# Patient Record
Sex: FEMALE | Race: ASIAN | URBAN - METROPOLITAN AREA
[De-identification: names, ages, dates, MRNs, and addresses within clinical notes are randomized per-mention and may not be internally consistent; named-entity substitution may affect disease eponyms.]

---

## 2017-03-20 ENCOUNTER — OFFICE VISIT (OUTPATIENT)
Dept: FAMILY MEDICINE CLINIC | Age: 82
End: 2017-03-20

## 2017-03-20 VITALS
HEART RATE: 72 BPM | WEIGHT: 95.8 LBS | TEMPERATURE: 97.4 F | BODY MASS INDEX: 21.55 KG/M2 | SYSTOLIC BLOOD PRESSURE: 138 MMHG | DIASTOLIC BLOOD PRESSURE: 78 MMHG | HEIGHT: 56 IN

## 2017-03-20 DIAGNOSIS — I10 ESSENTIAL HYPERTENSION WITH GOAL BLOOD PRESSURE LESS THAN 140/90: Primary | ICD-10-CM

## 2017-03-20 RX ORDER — ATENOLOL 50 MG/1
50 TABLET ORAL DAILY
Qty: 90 TAB | Refills: 1 | Status: SHIPPED | OUTPATIENT
Start: 2017-03-20 | End: 2017-09-18 | Stop reason: SDUPTHER

## 2017-03-20 NOTE — PATIENT INSTRUCTIONS

## 2017-03-20 NOTE — PROGRESS NOTES
Assessment/Plan:       ICD-10-CM ICD-9-CM    1. Essential hypertension with goal blood pressure less than 140/90 I10 401.9 atenolol (TENORMIN) 50 mg tablet     Follow-up Disposition:  Return in about 5 months (around 8/20/2017) for or sooner as needed. [facility] [place]  Subjective:   Orquidea Diaz is a 80 y.o.  female who speaks [place]. She has 9 children. Youngest daughter is age 39 and is here with her today. She did take this medication this morning. Chief Complaint   Patient presents with    Medication Refill    Follow-up    History of Present Illness: She ate bread and cheese around 11 am.  Used cream cheese on [place] bread. And water. and sweet rice made with sesame seeds, salt and sugar (sticky rice). I checked the nutritional value of sodium for [place] bread and it is quite high. For one \"medium\" slice weighing 59 gm, there is 425 mg of sodium. Also has 8g protein, 1.8gm fiber, 37g carb. Cream cheese is one of the lower sodium spreads at 85mg per oz. Review of Systems: Negative for: fever, chest pain, shortness of breath, leg swelling, exertional dyspnea, palpitations. Past Surgical History: She  has no past surgical history on file. Social History: She  reports that she has never smoked. She does not have any smokeless tobacco history on file. She reports that she does not drink alcohol or use illicit drugs. Objective:     Vitals:    03/20/17 1209 03/20/17 1417   BP: 181/89 138/78   Pulse: 72    Temp: 97.4 °F (36.3 °C)    TempSrc: Oral    Weight: 95 lb 12.8 oz (43.5 kg)    Height: 4' 7.51\" (1.41 m)     No LMP recorded. Patient is postmenopausal.   Wt Readings from Last 2 Encounters:   03/20/17 95 lb 12.8 oz (43.5 kg)   07/18/16 91 lb (41.3 kg)     Lab Review:No results found for any visits on 03/20/17. Physical Examination:BP high today but improved on recheck. General appearance - well developed, no acute distress. Chest - clear to auscultation.   Heart - regular rate and rhythm without murmurs, rubs, or gallops. Abdomen - bowel sounds present x 4, NT, ND. Extremities - pulses intact. No peripheral edema. Assessment/Plan:   Tuyet Villalta was seen today for medication refill and follow-up. Diagnoses and all orders for this visit:    Essential hypertension with goal blood pressure less than 140/90  -     atenolol (TENORMIN) 50 mg tablet; Take 1 Tab by mouth daily. For blood pressure. Traditional foods/flavorings are more identifiable as sources of sodium. [place] bread is surprisingly high in sodium also. Limit this or seek out lower sodium breads. Follow-up Disposition:  Return in about 5 months (around 8/20/2017) for or sooner as needed. Jmamie Gross, MSN, RN, FNP-BC, BC-ADM  Gretchen Kasper expressed understanding of this plan.

## 2017-03-20 NOTE — PROGRESS NOTES
Coordination of Care  1. Have you been to the ER, urgent care clinic since your last visit? Hospitalized since your last visit? No    2. Have you seen or consulted any other health care providers outside of the [address] [place] [address] since your last visit? Include any pap smears or colon screening. No    Medications  Medication Reconciliation Performed: no  Patient does need refills     Learning Assessment Complete?  yes

## 2017-09-18 ENCOUNTER — OFFICE VISIT (OUTPATIENT)
Dept: FAMILY MEDICINE CLINIC | Age: 82
End: 2017-09-18

## 2017-09-18 ENCOUNTER — HOSPITAL ENCOUNTER (OUTPATIENT)
Dept: LAB | Age: 82
Discharge: HOME | End: 2017-09-18

## 2017-09-18 VITALS
TEMPERATURE: 97.7 F | BODY MASS INDEX: 21.4 KG/M2 | HEART RATE: 55 BPM | WEIGHT: 93.8 LBS | SYSTOLIC BLOOD PRESSURE: 160 MMHG | DIASTOLIC BLOOD PRESSURE: 86 MMHG

## 2017-09-18 DIAGNOSIS — I10 ESSENTIAL HYPERTENSION WITH GOAL BLOOD PRESSURE LESS THAN 140/90: Primary | ICD-10-CM

## 2017-09-18 DIAGNOSIS — I10 ESSENTIAL HYPERTENSION WITH GOAL BLOOD PRESSURE LESS THAN 140/90: ICD-10-CM

## 2017-09-18 PROCEDURE — 80053 COMPREHEN METABOLIC PANEL: CPT | Performed by: NURSE PRACTITIONER

## 2017-09-18 RX ORDER — ATENOLOL 50 MG/1
50 TABLET ORAL DAILY
Qty: 90 TAB | Refills: 1 | Status: SHIPPED | OUTPATIENT
Start: 2017-09-18 | End: 2017-12-18 | Stop reason: SDUPTHER

## 2017-09-18 NOTE — PROGRESS NOTES
Assessment/Plan:       ICD-10-CM ICD-9-CM    1. Essential hypertension with goal blood pressure less than 140/90 I10 401.9 atenolol (TENORMIN) 50 mg tablet      METABOLIC PANEL, COMPREHENSIVE     Follow-up Disposition:  Return in about 4 weeks (around 10/16/2017) for hypertension. Subjective:   Olive Shen is a [de-identified] y.o. female who presents for follow up of hypertension. She usually takes her blood pressure medication after breakfast, 7:30 or 8 am.    Chief Complaint   Patient presents with    Hypertension     Hypertension recheck    She also  has no past medical history on file. Radha Walters HPI:   Last took medication:   Measuring blood pressures outside of clinic: no   Working? No Physical activity: no  Healthy diet? No   Family History: family history is not on file. Social History:  reports that she has never smoked. She does not have any smokeless tobacco history on file. She reports that she does not drink alcohol or use illicit drugs. Cardiovascular ROS: no chest pain on exertion, no dyspnea on exertion, no swelling of ankles. Objective:     Vitals:    09/18/17 1302 09/18/17 1304 09/18/17 1347   BP: 192/86 (!) 208/80 160/86   Pulse: 60 (!) 55    Temp: 97.7 °F (36.5 °C)     TempSrc: Oral     Weight: 93 lb 12.8 oz (42.5 kg)       Wt Readings from Last 2 Encounters:   09/18/17 93 lb 12.8 oz (42.5 kg)   03/20/17 95 lb 12.8 oz (43.5 kg)     No visits with results within 1 Month(s) from this visit.   Latest known visit with results is:    Hospital Outpatient Visit on 12/21/2015   Component Date Value Ref Range Status    Sodium 12/21/2015 140  136 - 145 mmol/L Final    Potassium 12/21/2015 4.2  3.5 - 5.1 mmol/L Final    Chloride 12/21/2015 103  97 - 108 mmol/L Final    CO2 12/21/2015 30  21 - 32 mmol/L Final    Anion gap 12/21/2015 7  5 - 15 mmol/L Final    Glucose 12/21/2015 79  65 - 100 mg/dL Final    BUN 12/21/2015 18  6 - 20 MG/DL Final    Creatinine 12/21/2015 0.69  0.55 - 1.02 MG/DL Final    BUN/Creatinine ratio 12/21/2015 26* 12 - 20   Final    GFR est AA 12/21/2015 >60  >60 ml/min/1.73m2 Final    GFR est non-AA 12/21/2015 >60  >60 ml/min/1.73m2 Final    Comment: Estimated GFR is calculated using the IDMS-traceable Modification of Diet in Renal Disease (MDRD) Study equation, reported for both  Americans (GFRAA) and non- Americans (GFRNA), and normalized to 1.73m2 body surface area. The physician must decide which value applies to the patient. The MDRD study equation should only be used in individuals age 25 or older. It has not been validated for the following: pregnant women, patients with serious comorbid conditions, or on certain medications, or persons with extremes of body size, muscle mass, or nutritional status.  Calcium 12/21/2015 9.2  8.5 - 10.1 MG/DL Final    Bilirubin, total 12/21/2015 0.4  0.2 - 1.0 MG/DL Final    ALT (SGPT) 12/21/2015 53  12 - 78 U/L Final    AST (SGOT) 12/21/2015 29  15 - 37 U/L Final    Alk. phosphatase 12/21/2015 84  45 - 117 U/L Final    Protein, total 12/21/2015 8.2  6.4 - 8.2 g/dL Final    Albumin 12/21/2015 4.4  3.5 - 5.0 g/dL Final    Globulin 12/21/2015 3.8  2.0 - 4.0 g/dL Final    A-G Ratio 12/21/2015 1.2  1.1 - 2.2   Final     Labs discussed with patient. Appearance: alert, well appearing, and in no distress and oriented to person, place, and time. General exam: CVS exam BP noted to be moderately elevated today in office, S1, S2 normal, no gallop, no murmur, chest clear, no JVD, no HSM, no edema. Assessment/Plan:   Hypertension in fair control. Diagnoses and all orders for this visit:    1. Essential hypertension with goal blood pressure less than 140/90  -     atenolol (TENORMIN) 50 mg tablet; Take 1 Tab by mouth daily. For blood pressure.  -     METABOLIC PANEL, COMPREHENSIVE; Future    Difficult to  blood pressure since she had not taken her medication this morning. Return 1 month for recheck.   If medication still in short supply, may need to change to metoprolol.

## 2017-09-19 LAB
ALBUMIN SERPL-MCNC: 4.1 G/DL (ref 3.5–5)
ALBUMIN/GLOB SERPL: 1 {RATIO} (ref 1.1–2.2)
ALP SERPL-CCNC: 93 U/L (ref 45–117)
ALT SERPL-CCNC: 49 U/L (ref 12–78)
ANION GAP SERPL CALC-SCNC: 7 MMOL/L (ref 5–15)
AST SERPL-CCNC: 32 U/L (ref 15–37)
BILIRUB SERPL-MCNC: 0.4 MG/DL (ref 0.2–1)
BUN SERPL-MCNC: 12 MG/DL (ref 6–20)
BUN/CREAT SERPL: 16 (ref 12–20)
CALCIUM SERPL-MCNC: 9.5 MG/DL (ref 8.5–10.1)
CHLORIDE SERPL-SCNC: 103 MMOL/L (ref 97–108)
CO2 SERPL-SCNC: 31 MMOL/L (ref 21–32)
CREAT SERPL-MCNC: 0.73 MG/DL (ref 0.55–1.02)
GLOBULIN SER CALC-MCNC: 4.2 G/DL (ref 2–4)
GLUCOSE SERPL-MCNC: 89 MG/DL (ref 65–100)
POTASSIUM SERPL-SCNC: 4.3 MMOL/L (ref 3.5–5.1)
PROT SERPL-MCNC: 8.3 G/DL (ref 6.4–8.2)
SODIUM SERPL-SCNC: 141 MMOL/L (ref 136–145)

## 2017-12-18 ENCOUNTER — OFFICE VISIT (OUTPATIENT)
Dept: FAMILY MEDICINE CLINIC | Age: 82
End: 2017-12-18

## 2017-12-18 VITALS
HEART RATE: 55 BPM | WEIGHT: 95 LBS | TEMPERATURE: 98.3 F | DIASTOLIC BLOOD PRESSURE: 90 MMHG | HEIGHT: 55 IN | SYSTOLIC BLOOD PRESSURE: 160 MMHG | BODY MASS INDEX: 21.98 KG/M2

## 2017-12-18 DIAGNOSIS — I10 ESSENTIAL HYPERTENSION WITH GOAL BLOOD PRESSURE LESS THAN 140/90: Primary | ICD-10-CM

## 2017-12-18 RX ORDER — ATENOLOL 50 MG/1
50 TABLET ORAL DAILY
Qty: 180 TAB | Refills: 0 | Status: SHIPPED | OUTPATIENT
Start: 2017-12-18 | End: 2018-05-21 | Stop reason: SDUPTHER

## 2017-12-18 NOTE — PROGRESS NOTES
Assessment/Plan:       ICD-10-CM ICD-9-CM    1. Essential hypertension with goal blood pressure less than 140/90 I10 401.9 atenolol (TENORMIN) 50 mg tablet   Please measure blood pressures outside of clinic and bring measurements with you next time. Carlos A [address] [place] [address]  Phone: [phone] Fax: [phone]      [facility]  Subjective:     Chief Complaint   Patient presents with    Hypertension     Hypertension recheck    Medication Refill    Leonila Alvarado is a [de-identified] y.o.  female. HPI: Last took her medication at 8 am this morning. Takes the medication regularly each day. Doesn't measure bp outside of clinic. She  has no past medical history on file. She has Essential hypertension with goal blood pressure less than 140/90 on her problem list.   Review of Systems: Negative for: fever, chest pain, shortness of breath, leg swelling, exertional dyspnea, palpitations. Current Medications:   No current outpatient prescriptions on file prior to visit. No current facility-administered medications on file prior to visit. Past Surgical History: She  has no past surgical history on file. Social and Family History: She  reports that she has never smoked. She does not have any smokeless tobacco history on file. She reports that she does not drink alcohol or use illicit drugs. ; family history is not on file. Objective:     Vitals:    12/18/17 1047 12/18/17 1050 12/18/17 1221   BP: 181/76 200/76 160/90   Pulse: 60 (!) 55    Temp: 98.3 °F (36.8 °C)     TempSrc: Oral     Weight: 95 lb (43.1 kg)     Height: 4' 6.72\" (1.39 m)      No LMP recorded. Patient is postmenopausal.   Wt Readings from Last 2 Encounters:   12/18/17 95 lb (43.1 kg)   09/18/17 93 lb 12.8 oz (42.5 kg)     No results found for any visits on 12/18/17. Physical Examination:  General appearance - well developed, no acute distress.    Chest - clear to auscultation. Heart - regular rate and rhythm without murmurs, rubs, or gallops. Abdomen - bowel sounds present x 4, NT, ND. Extremities - pulses intact. No peripheral edema. Assessment/Plan:   Diagnoses and all orders for this visit:    1. Essential hypertension with goal blood pressure less than 140/90  -     atenolol (TENORMIN) 50 mg tablet; Take 1 Tab by mouth daily. For blood pressure. Follow-up Disposition:  Return in about 5 months (around 5/18/2018) for hypertension. Meryle Racer, DNP, FNP-BC, BC-ADM  Cathy Ellison expressed understanding of this plan.

## 2017-12-18 NOTE — PROGRESS NOTES
Printed AVS, provided to pt and reviewed. Pt indicated understanding and had no questions. The pts medication were reviewed with her using Adela funez for . The pt was given a written rx. The medication was in stock at [facility] when nurse called. The medication was found to be less expensive at [facility]. The pt was told she could take it to either Pharmacy.  Emilee Romano RN

## 2018-05-21 ENCOUNTER — OFFICE VISIT (OUTPATIENT)
Dept: FAMILY MEDICINE CLINIC | Age: 83
End: 2018-05-21

## 2018-05-21 VITALS
HEART RATE: 61 BPM | TEMPERATURE: 97.7 F | DIASTOLIC BLOOD PRESSURE: 80 MMHG | BODY MASS INDEX: 22.07 KG/M2 | WEIGHT: 94 LBS | SYSTOLIC BLOOD PRESSURE: 200 MMHG

## 2018-05-21 DIAGNOSIS — I10 ESSENTIAL HYPERTENSION WITH GOAL BLOOD PRESSURE LESS THAN 140/90: Primary | ICD-10-CM

## 2018-05-21 RX ORDER — HYDROCHLOROTHIAZIDE 12.5 MG/1
12.5 CAPSULE ORAL DAILY
Qty: 90 CAP | Refills: 1 | Status: SHIPPED | OUTPATIENT
Start: 2018-05-21 | End: 2018-07-16 | Stop reason: SDUPTHER

## 2018-05-21 RX ORDER — ATENOLOL 50 MG/1
50 TABLET ORAL DAILY
Qty: 90 TAB | Refills: 1 | Status: SHIPPED | OUTPATIENT
Start: 2018-05-21 | End: 2018-07-16 | Stop reason: SDUPTHER

## 2018-05-21 NOTE — PROGRESS NOTES
Coordination of Care  1. Have you been to the ER, urgent care clinic since your last visit? Hospitalized since your last visit? No    2. Have you seen or consulted any other health care providers outside of the [address] [place] [address] since your last visit? Include any pap smears or colon screening. No    Does the patient need refills? N/A    Learning Assessment Complete?  yes

## 2018-05-21 NOTE — PROGRESS NOTES
Assessment/Plan:       ICD-10-CM ICD-9-CM    1. Essential hypertension with goal blood pressure less than 140/90 I10 401.9 atenolol (TENORMIN) 50 mg tablet      hydroCHLOROthiazide (MICROZIDE) 12.5 mg capsule   Added hctz 12.5mg to her current atenolol 50mg. Follow-up Disposition:  Return in about 4 weeks (around 6/18/2018) for hypertension. [address], [address] [facility]  [address]  Phone: [phone] Fax: [phone]      [facility]  Subjective:     Chief Complaint   Patient presents with    Hypertension    Medication Refill    Jazzy Perez is a [de-identified] y.o.  female. HPI:   She  has no past medical history on file. Review of Systems: Negative for: fever, chest pain, shortness of breath, leg swelling, exertional dyspnea, palpitations. Current Medications:   No current outpatient prescriptions on file prior to visit. No current facility-administered medications on file prior to visit. Social History: She  reports that she has never smoked. She has never used smokeless tobacco. She reports that she does not drink alcohol or use illicit drugs. Objective:     Vitals:    05/21/18 1007 05/21/18 1009 05/21/18 1151 05/21/18 1152   BP: 196/88 (!) 219/90 200/62 200/80   Pulse: 72 61     Temp: 97.7 °F (36.5 °C)      TempSrc: Oral      Weight: 94 lb (42.6 kg)       No LMP recorded. Patient is postmenopausal.   Wt Readings from Last 2 Encounters:   05/21/18 94 lb (42.6 kg)   12/18/17 95 lb (43.1 kg)     No results found for any visits on 05/21/18. Physical Examination:  General appearance - well developed, no acute distress. Chest - clear to auscultation. Heart - regular rate and rhythm without murmurs, rubs, or gallops. Abdomen - bowel sounds present x 4, NT, ND. Extremities - pulses intact. No peripheral edema. Assessment/Plan:   Diagnoses and all orders for this visit:    1.  Essential hypertension with goal blood pressure less than 140/90  -     atenolol (TENORMIN) 50 mg tablet; Take 1 Tab by mouth daily. For blood pressure. -     hydroCHLOROthiazide (MICROZIDE) 12.5 mg capsule; Take 1 Cap by mouth daily. Follow-up Disposition:  Return in about 4 weeks (around 6/18/2018) for hypertension. Eri Gonzalez DNP, FNP-BC, BC-ADM  Ashli Ped expressed understanding of this plan.

## 2018-07-16 ENCOUNTER — OFFICE VISIT (OUTPATIENT)
Dept: FAMILY MEDICINE CLINIC | Age: 83
End: 2018-07-16

## 2018-07-16 VITALS
HEIGHT: 55 IN | TEMPERATURE: 97.7 F | WEIGHT: 95.4 LBS | DIASTOLIC BLOOD PRESSURE: 70 MMHG | SYSTOLIC BLOOD PRESSURE: 170 MMHG | BODY MASS INDEX: 22.08 KG/M2 | HEART RATE: 67 BPM

## 2018-07-16 DIAGNOSIS — I10 ESSENTIAL HYPERTENSION WITH GOAL BLOOD PRESSURE LESS THAN 140/90: ICD-10-CM

## 2018-07-16 RX ORDER — HYDROCHLOROTHIAZIDE 12.5 MG/1
12.5 CAPSULE ORAL DAILY
Qty: 90 CAP | Refills: 1 | Status: SHIPPED | OUTPATIENT
Start: 2018-07-16 | End: 2018-09-17 | Stop reason: SDUPTHER

## 2018-07-16 RX ORDER — ATENOLOL 50 MG/1
50 TABLET ORAL DAILY
Qty: 90 TAB | Refills: 1 | Status: SHIPPED | OUTPATIENT
Start: 2018-07-16 | End: 2018-09-17 | Stop reason: SDUPTHER

## 2018-07-16 NOTE — PROGRESS NOTES
The  Pt was not sent to discharge by provider. The pt was handed the printed signed rx's the rx's were reviewed with the pt. Mr Anirudh Mccullough was the .  Ruby Cabrera RN

## 2018-07-16 NOTE — PROGRESS NOTES
Assessment/Plan:       ICD-10-CM ICD-9-CM    1. Essential hypertension with goal blood pressure less than 140/90 I10 401.9 atenolol (TENORMIN) 50 mg tablet      hydroCHLOROthiazide (MICROZIDE) 12.5 mg capsule     Follow-up Disposition:  Return in about 5 weeks (around 8/20/2018) for hypertension. No Pharmacies Listed    [facility]  Subjective:     Chief Complaint   Patient presents with    Hypertension     f/u  med refill    Kelly Navarrete is a 82 y.o.  female. HPI: Last took 8 am today. SHE WENT TO HER PHARMACY LAST MONTH AND THEY DIDN'T HAVE THE MEDICATIONS.  aLWAYS PRINT THEM FOR HER. She  has no past medical history on file. Review of Systems: Negative for: fever, chest pain, shortness of breath, leg swelling, exertional dyspnea, palpitations. Current Medications:   No current outpatient prescriptions on file prior to visit. No current facility-administered medications on file prior to visit. Social History: She  reports that she has never smoked. She has never used smokeless tobacco. She reports that she does not drink alcohol or use illicit drugs. Objective:     Vitals:    07/16/18 1238 07/16/18 1247 07/16/18 1333   BP: 192/79 183/88 170/70   Pulse: 68 67    Temp: 97.7 °F (36.5 °C)     TempSrc: Oral     Weight: 95 lb 6.4 oz (43.3 kg)     Height: 4' 6.72\" (1.39 m)      No LMP recorded. Patient is postmenopausal.   Wt Readings from Last 2 Encounters:   07/16/18 95 lb 6.4 oz (43.3 kg)   05/21/18 94 lb (42.6 kg)     No results found for any visits on 07/16/18. Physical Examination:  General appearance - well developed, no acute distress. Chest - clear to auscultation. Heart - regular rate and rhythm without murmurs, rubs, or gallops. Abdomen - bowel sounds present x 4, NT, ND. Extremities - pulses intact. No peripheral edema. Assessment/Plan:   Diagnoses and all orders for this visit:    1.  Essential hypertension with goal blood pressure less than 140/90  - atenolol (TENORMIN) 50 mg tablet; Take 1 Tab by mouth daily. For blood pressure. -     hydroCHLOROthiazide (MICROZIDE) 12.5 mg capsule; Take 1 Cap by mouth daily. For blood pressure. Follow-up Disposition:  Return in about 5 weeks (around 8/20/2018) for hypertension. Andreina Dyer DNP, FNP-BC, BC-ADM  Sheila Aponte expressed understanding of this plan.

## 2018-07-16 NOTE — PROGRESS NOTES
Coordination of Care  1. Have you been to the ER, urgent care clinic since your last visit? Hospitalized since your last visit? No    2. Have you seen or consulted any other health care providers outside of the [address] [place] [address] since your last visit? Include any pap smears or colon screening. No    Does the patient need refills? YES    Learning Assessment Complete?  yes  Depression Screening complete in the past 12 months? yes

## 2018-09-17 ENCOUNTER — OFFICE VISIT (OUTPATIENT)
Dept: FAMILY MEDICINE CLINIC | Age: 83
End: 2018-09-17

## 2018-09-17 VITALS
SYSTOLIC BLOOD PRESSURE: 200 MMHG | DIASTOLIC BLOOD PRESSURE: 73 MMHG | BODY MASS INDEX: 22.3 KG/M2 | WEIGHT: 95 LBS | HEART RATE: 64 BPM

## 2018-09-17 DIAGNOSIS — I10 ESSENTIAL HYPERTENSION WITH GOAL BLOOD PRESSURE LESS THAN 140/90: Primary | ICD-10-CM

## 2018-09-17 RX ORDER — ATENOLOL 50 MG/1
50 TABLET ORAL DAILY
Qty: 180 TAB | Refills: 0 | Status: SHIPPED | OUTPATIENT
Start: 2018-09-17

## 2018-09-17 RX ORDER — LOSARTAN POTASSIUM AND HYDROCHLOROTHIAZIDE 12.5; 5 MG/1; MG/1
1 TABLET ORAL DAILY
Qty: 180 TAB | Refills: 0 | Status: SHIPPED | OUTPATIENT
Start: 2018-09-17

## 2018-09-17 NOTE — PROGRESS NOTES
Coordination of Care  1. Have you been to the ER, urgent care clinic since your last visit? Hospitalized since your last visit? No    2. Have you seen or consulted any other health care providers outside of the [address] [place] [address] since your last visit? Include any pap smears or colon screening. No    Does the patient need refills? YES    Learning Assessment Complete?  yes  Depression Screening complete in the past 12 months? yes